# Patient Record
Sex: MALE
[De-identification: names, ages, dates, MRNs, and addresses within clinical notes are randomized per-mention and may not be internally consistent; named-entity substitution may affect disease eponyms.]

---

## 2019-04-20 ENCOUNTER — HOSPITAL ENCOUNTER (EMERGENCY)
Dept: HOSPITAL 25 - UCEAST | Age: 19
Discharge: HOME | End: 2019-04-20
Payer: COMMERCIAL

## 2019-04-20 VITALS — SYSTOLIC BLOOD PRESSURE: 111 MMHG | DIASTOLIC BLOOD PRESSURE: 49 MMHG

## 2019-04-20 DIAGNOSIS — Z91.012: ICD-10-CM

## 2019-04-20 DIAGNOSIS — H00.012: Primary | ICD-10-CM

## 2019-04-20 PROCEDURE — G0463 HOSPITAL OUTPT CLINIC VISIT: HCPCS

## 2019-04-20 PROCEDURE — 99202 OFFICE O/P NEW SF 15 MIN: CPT

## 2019-04-20 NOTE — UC
Eye Complaint HPI





- HPI Summary


HPI Summary: 





2 DAYS OF SWELLING AND REDNESS TO RIGHT LOWER EYELID. NO VISUAL DISTURBANCE. NO 

FB SENSATION. DOES NOT WEAR CONTACTS. 





- History of Current Complaint


Chief Complaint: UCEye


Stated Complaint: EYE COMPLAINT


Time Seen by Provider: 04/20/19 15:15


Hx Obtained From: Patient


Onset/Duration: Gradual Onset, Lasting Days, Still Present


Timing: Constant


Severity Initially: Mild


Severity Currently: Moderate


Pain Intensity: 3


Pain Scale Used: 0-10 Numeric


Location of Injury: Eye Lid (lower) - RIGHT


Character: Dull


Aggravating Factor(s): Nothing


Alleviating Factor(s): Nothing


Associated Signs And Symptoms: Negative: Photophobia, Drainage (Clear), 

Drainage (Purulent), Vision Impairment Bilateral





- Allergies/Home Medications


Allergies/Adverse Reactions: 


 Allergies











Allergy/AdvReac Type Severity Reaction Status Date / Time


 


Egg Derived Allergy  Hives Verified 04/20/19 14:56














PMH/Surg Hx/FS Hx/Imm Hx


Previously Healthy: Yes





- Surgical History


Surgical History: None





- Family History


Known Family History: Positive: Non-Contributory





- Social History


Alcohol Use: None


Substance Use Type: Other


Substance Use Comment - Amount & Last Used: smoking Herbs


Smoking Status (MU): Never Smoked Tobacco





Review of Systems


All Other Systems Reviewed And Are Negative: Yes


Constitutional: Positive: Negative


Eyes: Positive: Other - RIGHT LOWER EYELID REDNESS AND SWELLING.  Negative: 

Blurred Vision, Drainage, Photophobia


Respiratory: Positive: Negative


Cardiovascular: Positive: Negative


Gastrointestinal: Positive: Negative


Neurological: Positive: Negative





Physical Exam


Triage Information Reviewed: Yes


Appearance: Well-Appearing, No Pain Distress, Well-Nourished


Vital Signs: 


 Initial Vital Signs











Temp  98.4 F   04/20/19 14:52


 


Pulse  86   04/20/19 14:52


 


Resp  12   04/20/19 14:52


 


BP  111/49   04/20/19 14:52


 


Pulse Ox  99   04/20/19 14:52











Vital Signs Reviewed: Yes


Eyes: Positive: Conjunctiva Clear, Other: - RIGHT LOWER EYELID WITH TENDER LUMP 

NASAL SIDE WITH OVERLYING EDEMA, ERYTHEMA. EOMI. PERRL.  Negative: Discharge


ENT: Positive: Hearing grossly normal


Neck: Positive: Supple


Respiratory: Positive: No respiratory distress, No accessory muscle use


Cardiovascular: Positive: Pulses Normal


Abdomen Description: Positive: Soft


Musculoskeletal: Positive: No Edema


Neurological: Positive: Alert


Psychological: Positive: Age Appropriate Behavior


Skin: Negative: Rashes





Eye Complaint Course/Dx





- Differential Dx/Diagnosis


Provider Diagnosis: 


 Hordeolum of right lower eyelid








Discharge





- Sign-Out/Discharge


Documenting (check all that apply): Patient Departure


All imaging exams completed and their final reports reviewed: No Studies





- Discharge Plan


Condition: Stable


Disposition: HOME


Prescriptions: 


Erythromycin OPHTH.OINT* [Ilotycin OPHTH.OINT*] 1 applic RIGHT EYE QID #1 tube


Patient Education Materials:  Stye (ED)


Referrals: 


[facility] @  [Outside] - If Needed


Anuj Lance MD [Medical Doctor] - 


Kermit Estrella MD [Medical Doctor] - 


Additional Instructions: 


What is a stye?  A stye is a red and painful lump on the eyelid. It happens 

when a small gland on the edge of the eyelid gets infected or inflamed. Styes 

can occur on the upper or lower eyelids. Most styes get better on their own 

after a few days to a week. Another word for stye is hordeolum.


People sometimes get a stye confused with a different eye problem called a 

chalazion. A chalazion also causes a lump on the eyelid. But a stye is caused 

by an infection and is painful. A chalazion is not tender or painful, but it 

often lasts longer than a stye does. 





What are the symptoms of a stye?  People who have a stye have a red and 

painful lump on the edge of their eyelid. A stye usually develops over a few 

days. It can look like a pimple. Styes can cause other symptoms, too, such as 

tearing and eyelid pain and swelling.





Is there a test for a stye?  No. But your doctor or nurse should be able to 

tell if you have a stye by doing an exam and talking with you.





Is there anything I can do on my own to feel better?  Yes. To ease your 

symptoms and help your stye get better, you can put warm, wet pressure on the 

stye. Wet a clean wash cloth with warm water and put it over your stye. When 

the wash cloth cools, reheat it with warm water and put it back over the stye. 

Repeat these steps for 5 to 15 minutes, and try to do this 3 to 4 times a day. 





You should NOT squeeze or pop your stye. Also, you should not wear eye makeup 

or contact lenses until your stye is all better.





Should I see a doctor or nurse?  See your doctor or nurse if:


 -Your stye doesnt go away after you treat it on your own for 1 week 


 -Your stye gets very big, bleeds, or affects your vision 


 -Your whole eye is red, or your whole eyelid is red and swollen 


 -The redness or swelling spreads to your cheek or other parts of your face





What treatments might my doctor use?  If your stye doesnt get better or if it 

leads to other problems, your doctor might:


 -Prescribe a cream or ointment that goes in the eye and on the eyelid 


 -Prescribe antibiotic medicines 


 -Drain the stye 





Can styes be prevented?  Yes. To lower your chances of getting a stye, you can:


 -Wash your hands before you touch your eyes 


 -Wash your hands before you put in contact lenses and keep your contact lenses 

clean (if you wear contact lenses) 


 -Take off your eye makeup each night 


 -Not share eye makeup with other people 





- Billing Disposition and Condition


Condition: STABLE


Disposition: Home